# Patient Record
Sex: MALE | Race: WHITE | NOT HISPANIC OR LATINO
[De-identification: names, ages, dates, MRNs, and addresses within clinical notes are randomized per-mention and may not be internally consistent; named-entity substitution may affect disease eponyms.]

---

## 2017-01-20 LAB — INR PPP: 1.18

## 2017-01-31 LAB — INR PPP: 1.17

## 2017-02-08 LAB — INR PPP: 1.17

## 2017-02-17 LAB — INR PPP: 1.19

## 2017-03-06 ENCOUNTER — APPOINTMENT (OUTPATIENT)
Dept: CARDIOLOGY | Facility: CLINIC | Age: 82
End: 2017-03-06

## 2017-03-06 LAB — INR PPP: 1.41

## 2017-03-14 LAB — INR PPP: 6.06

## 2017-03-23 LAB — INR PPP: 4.92

## 2017-04-27 ENCOUNTER — INPATIENT (INPATIENT)
Facility: HOSPITAL | Age: 82
LOS: 3 days | Discharge: NURSING, REHAB OR LONG-TERM CARE | DRG: 291 | End: 2017-05-01
Attending: INTERNAL MEDICINE | Admitting: INTERNAL MEDICINE
Payer: MEDICARE

## 2017-04-27 VITALS
OXYGEN SATURATION: 95 % | WEIGHT: 164.91 LBS | TEMPERATURE: 98 F | RESPIRATION RATE: 17 BRPM | DIASTOLIC BLOOD PRESSURE: 89 MMHG | HEIGHT: 65 IN | SYSTOLIC BLOOD PRESSURE: 165 MMHG | HEART RATE: 80 BPM

## 2017-04-27 DIAGNOSIS — R07.9 CHEST PAIN, UNSPECIFIED: ICD-10-CM

## 2017-04-27 DIAGNOSIS — Z95.0 PRESENCE OF CARDIAC PACEMAKER: Chronic | ICD-10-CM

## 2017-04-27 LAB
ALBUMIN SERPL ELPH-MCNC: 3.4 G/DL — SIGNIFICANT CHANGE UP (ref 3.3–5)
ALP SERPL-CCNC: 138 U/L — HIGH (ref 40–120)
ALT FLD-CCNC: 17 U/L — SIGNIFICANT CHANGE UP (ref 12–78)
ANION GAP SERPL CALC-SCNC: 10 MMOL/L — SIGNIFICANT CHANGE UP (ref 5–17)
APTT BLD: 46 SEC — HIGH (ref 27.5–37.4)
AST SERPL-CCNC: 27 U/L — SIGNIFICANT CHANGE UP (ref 15–37)
BASOPHILS # BLD AUTO: 0.2 K/UL — SIGNIFICANT CHANGE UP (ref 0–0.2)
BASOPHILS NFR BLD AUTO: 2 % — SIGNIFICANT CHANGE UP (ref 0–2)
BILIRUB SERPL-MCNC: 1.2 MG/DL — SIGNIFICANT CHANGE UP (ref 0.2–1.2)
BUN SERPL-MCNC: 53 MG/DL — HIGH (ref 7–23)
CALCIUM SERPL-MCNC: 9.8 MG/DL — SIGNIFICANT CHANGE UP (ref 8.5–10.1)
CHLORIDE SERPL-SCNC: 114 MMOL/L — HIGH (ref 96–108)
CK MB BLD-MCNC: 5.6 % — HIGH (ref 0–3.5)
CK MB CFR SERPL CALC: 3.8 NG/ML — HIGH (ref 0–3.6)
CK SERPL-CCNC: 68 U/L — SIGNIFICANT CHANGE UP (ref 26–308)
CO2 SERPL-SCNC: 22 MMOL/L — SIGNIFICANT CHANGE UP (ref 22–31)
CREAT SERPL-MCNC: 1.9 MG/DL — HIGH (ref 0.5–1.3)
EOSINOPHIL # BLD AUTO: 0.1 K/UL — SIGNIFICANT CHANGE UP (ref 0–0.5)
EOSINOPHIL NFR BLD AUTO: 1.9 % — SIGNIFICANT CHANGE UP (ref 0–6)
GLUCOSE SERPL-MCNC: 108 MG/DL — HIGH (ref 70–99)
HCT VFR BLD CALC: 40.1 % — SIGNIFICANT CHANGE UP (ref 39–50)
HGB BLD-MCNC: 12.8 G/DL — LOW (ref 13–17)
INR BLD: 3.2 RATIO — HIGH (ref 0.88–1.16)
LYMPHOCYTES # BLD AUTO: 0.7 K/UL — LOW (ref 1–3.3)
LYMPHOCYTES # BLD AUTO: 9.2 % — LOW (ref 13–44)
MCHC RBC-ENTMCNC: 30.2 PG — SIGNIFICANT CHANGE UP (ref 27–34)
MCHC RBC-ENTMCNC: 31.8 GM/DL — LOW (ref 32–36)
MCV RBC AUTO: 95.2 FL — SIGNIFICANT CHANGE UP (ref 80–100)
MONOCYTES # BLD AUTO: 0.6 K/UL — SIGNIFICANT CHANGE UP (ref 0–0.9)
MONOCYTES NFR BLD AUTO: 7.4 % — SIGNIFICANT CHANGE UP (ref 1–9)
NEUTROPHILS # BLD AUTO: 6.1 K/UL — SIGNIFICANT CHANGE UP (ref 1.8–7.4)
NEUTROPHILS NFR BLD AUTO: 79.5 % — HIGH (ref 43–77)
NT-PROBNP SERPL-SCNC: HIGH PG/ML (ref 0–450)
PLATELET # BLD AUTO: 141 K/UL — LOW (ref 150–400)
POTASSIUM SERPL-MCNC: 5.1 MMOL/L — SIGNIFICANT CHANGE UP (ref 3.5–5.3)
POTASSIUM SERPL-SCNC: 5.1 MMOL/L — SIGNIFICANT CHANGE UP (ref 3.5–5.3)
PROT SERPL-MCNC: 7.6 G/DL — SIGNIFICANT CHANGE UP (ref 6–8.3)
PROTHROM AB SERPL-ACNC: 35.7 SEC — HIGH (ref 9.8–12.7)
RBC # BLD: 4.22 M/UL — SIGNIFICANT CHANGE UP (ref 4.2–5.8)
RBC # FLD: 15.1 % — HIGH (ref 10.3–14.5)
SODIUM SERPL-SCNC: 146 MMOL/L — HIGH (ref 135–145)
TROPONIN I SERPL-MCNC: 0.03 NG/ML — SIGNIFICANT CHANGE UP (ref 0.01–0.04)
TROPONIN I SERPL-MCNC: 0.04 NG/ML — SIGNIFICANT CHANGE UP (ref 0.01–0.04)
WBC # BLD: 7.7 K/UL — SIGNIFICANT CHANGE UP (ref 3.8–10.5)
WBC # FLD AUTO: 7.7 K/UL — SIGNIFICANT CHANGE UP (ref 3.8–10.5)

## 2017-04-27 PROCEDURE — 93010 ELECTROCARDIOGRAM REPORT: CPT

## 2017-04-27 PROCEDURE — 71020: CPT | Mod: 26

## 2017-04-27 PROCEDURE — 99285 EMERGENCY DEPT VISIT HI MDM: CPT

## 2017-04-27 PROCEDURE — 99223 1ST HOSP IP/OBS HIGH 75: CPT

## 2017-04-27 RX ORDER — SIMVASTATIN 20 MG/1
10 TABLET, FILM COATED ORAL AT BEDTIME
Qty: 0 | Refills: 0 | Status: DISCONTINUED | OUTPATIENT
Start: 2017-04-27 | End: 2017-05-01

## 2017-04-27 RX ORDER — HEPARIN SODIUM 5000 [USP'U]/ML
5000 INJECTION INTRAVENOUS; SUBCUTANEOUS EVERY 12 HOURS
Qty: 0 | Refills: 0 | Status: DISCONTINUED | OUTPATIENT
Start: 2017-04-27 | End: 2017-04-28

## 2017-04-27 RX ORDER — DEXTROSE 50 % IN WATER 50 %
25 SYRINGE (ML) INTRAVENOUS ONCE
Qty: 0 | Refills: 0 | Status: DISCONTINUED | OUTPATIENT
Start: 2017-04-27 | End: 2017-05-01

## 2017-04-27 RX ORDER — FUROSEMIDE 40 MG
80 TABLET ORAL
Qty: 0 | Refills: 0 | Status: DISCONTINUED | OUTPATIENT
Start: 2017-04-27 | End: 2017-04-28

## 2017-04-27 RX ORDER — GABAPENTIN 400 MG/1
100 CAPSULE ORAL
Qty: 0 | Refills: 0 | Status: DISCONTINUED | OUTPATIENT
Start: 2017-04-27 | End: 2017-05-01

## 2017-04-27 RX ORDER — SODIUM CHLORIDE 9 MG/ML
1000 INJECTION, SOLUTION INTRAVENOUS
Qty: 0 | Refills: 0 | Status: DISCONTINUED | OUTPATIENT
Start: 2017-04-27 | End: 2017-05-01

## 2017-04-27 RX ORDER — GLUCAGON INJECTION, SOLUTION 0.5 MG/.1ML
1 INJECTION, SOLUTION SUBCUTANEOUS ONCE
Qty: 0 | Refills: 0 | Status: DISCONTINUED | OUTPATIENT
Start: 2017-04-27 | End: 2017-05-01

## 2017-04-27 RX ORDER — HEPARIN SODIUM 5000 [USP'U]/ML
5000 INJECTION INTRAVENOUS; SUBCUTANEOUS EVERY 12 HOURS
Qty: 0 | Refills: 0 | Status: DISCONTINUED | OUTPATIENT
Start: 2017-04-27 | End: 2017-04-27

## 2017-04-27 RX ORDER — SODIUM CHLORIDE 9 MG/ML
3 INJECTION INTRAMUSCULAR; INTRAVENOUS; SUBCUTANEOUS ONCE
Qty: 0 | Refills: 0 | Status: COMPLETED | OUTPATIENT
Start: 2017-04-27 | End: 2017-04-27

## 2017-04-27 RX ORDER — GABAPENTIN 400 MG/1
100 CAPSULE ORAL
Qty: 0 | Refills: 0 | Status: DISCONTINUED | OUTPATIENT
Start: 2017-04-27 | End: 2017-04-27

## 2017-04-27 RX ORDER — LOSARTAN POTASSIUM 100 MG/1
25 TABLET, FILM COATED ORAL DAILY
Qty: 0 | Refills: 0 | Status: DISCONTINUED | OUTPATIENT
Start: 2017-04-27 | End: 2017-04-30

## 2017-04-27 RX ORDER — DEXTROSE 50 % IN WATER 50 %
12.5 SYRINGE (ML) INTRAVENOUS ONCE
Qty: 0 | Refills: 0 | Status: DISCONTINUED | OUTPATIENT
Start: 2017-04-27 | End: 2017-05-01

## 2017-04-27 RX ORDER — DEXTROSE 50 % IN WATER 50 %
1 SYRINGE (ML) INTRAVENOUS ONCE
Qty: 0 | Refills: 0 | Status: DISCONTINUED | OUTPATIENT
Start: 2017-04-27 | End: 2017-05-01

## 2017-04-27 RX ORDER — INSULIN LISPRO 100/ML
VIAL (ML) SUBCUTANEOUS
Qty: 0 | Refills: 0 | Status: DISCONTINUED | OUTPATIENT
Start: 2017-04-27 | End: 2017-05-01

## 2017-04-27 RX ORDER — ASPIRIN/CALCIUM CARB/MAGNESIUM 324 MG
325 TABLET ORAL ONCE
Qty: 0 | Refills: 0 | Status: COMPLETED | OUTPATIENT
Start: 2017-04-27 | End: 2017-04-27

## 2017-04-27 RX ADMIN — SIMVASTATIN 10 MILLIGRAM(S): 20 TABLET, FILM COATED ORAL at 22:52

## 2017-04-27 RX ADMIN — Medication: at 22:59

## 2017-04-27 RX ADMIN — Medication 80 MILLIGRAM(S): at 19:15

## 2017-04-27 RX ADMIN — LOSARTAN POTASSIUM 25 MILLIGRAM(S): 100 TABLET, FILM COATED ORAL at 22:53

## 2017-04-27 RX ADMIN — SODIUM CHLORIDE 3 MILLILITER(S): 9 INJECTION INTRAMUSCULAR; INTRAVENOUS; SUBCUTANEOUS at 13:46

## 2017-04-27 RX ADMIN — GABAPENTIN 100 MILLIGRAM(S): 400 CAPSULE ORAL at 22:52

## 2017-04-27 NOTE — H&P ADULT - ASSESSMENT
Eric presents to the emergency department with increasing dyspnea over the last 3 days. His wife was at [facility] and now rehabilitation as he has been by himself. Believes he is taking his medication but has not been eating well and has been eating fair foods. He had increasing shortness of breath at rest today, worse with lying down and came to the emergency department. He reports no chest pain, palpitations, lightheadedness, or syncope. He does have some worsening of his chronic leg edema  admit , diuretics , and ro mi , and cardiac eval , and monitor ,     PAST MEDICAL & SURGICAL HISTORY:  History of prostate cancer  Hypercholesteremia  HTN (hypertension)  H/O heart artery stent  DM (diabetes mellitus)  CAD (coronary artery disease)  Artificial cardiac pacemaker  A-fib  History of permanent cardiac pacemaker placement  History of cholecystectomy  H/O prostatectomy  Hx of CABG

## 2017-04-27 NOTE — ED PROVIDER NOTE - PROGRESS NOTE DETAILS
ABILIO Johnson, will see pt to admit. pt with some baseline mental status issues, per daughter. Pt on 1:1 now for mult attempts to get out of bed.

## 2017-04-27 NOTE — ED PROVIDER NOTE - CARE PLAN
Principal Discharge DX:	Chest pain, unspecified type  Secondary Diagnosis:	Shortness of breath  Secondary Diagnosis:	CHF (congestive heart failure)

## 2017-04-27 NOTE — ED PROVIDER NOTE - OBJECTIVE STATEMENT
89 yo M p/w co chest heaviness since this am, some dyspnea as well. Some LE edema. No numb/ting/focal weak. no abd pain. No vomiting / diarrhea. No neck / back pain. No numb/ting/focal weak. No recent travel. Some LE edema noted as well. No agg/allev factors. No other inj or co.

## 2017-04-27 NOTE — H&P ADULT - HISTORY OF PRESENT ILLNESS
HPI:  Eric presents to the emergency department with increasing dyspnea over the last 3 days. His wife was at [facility] and now rehabilitation as he has been by himself. Believes he is taking his medication but has not been eating well and has been eating fair foods. He had increasing shortness of breath at rest today, worse with lying down and came to the emergency department. He reports no chest pain, palpitations, lightheadedness, or syncope. He does have some worsening of his chronic leg edema      PAST MEDICAL & SURGICAL HISTORY:  History of prostate cancer  Hypercholesteremia  HTN (hypertension)  H/O heart artery stent  DM (diabetes mellitus)  CAD (coronary artery disease)  Artificial cardiac pacemaker  A-fib  History of permanent cardiac pacemaker placement  History of cholecystectomy  H/O prostatectomy  Hx of CABG

## 2017-04-27 NOTE — H&P ADULT - NSHPSOCIALHISTORY_GEN_ALL_CORE
, plus kids , lives with wife , who is in rehab now , and non smoker , , walks with a marcos, depressed , dtr in [place] and has some help at home ,

## 2017-04-27 NOTE — ED PROVIDER NOTE - RESPIRATORY, MLM
Breath sounds clear and equal  bilaterally except dec bs at bases. nl resp effort. No acc muscle use

## 2017-04-27 NOTE — ED ADULT NURSE NOTE - PMH
A-fib    Artificial cardiac pacemaker    CAD (coronary artery disease)    DM (diabetes mellitus)    H/O heart artery stent    History of prostate cancer    HTN (hypertension)    Hypercholesteremia

## 2017-04-27 NOTE — CONSULT NOTE ADULT - SUBJECTIVE AND OBJECTIVE BOX
[facility] Consultation    CHIEF COMPLAINT: Patient is a 89y old  Male who presents with a chief complaint of SOB and weakness    HPI:  Eric presents to the emergency department with increasing dyspnea over the last 3 days. His wife was at [facility] and now rehabilitation as he has been by himself. Believes he is taking his medication but has not been eating well and has been eating fair foods. He had increasing shortness of breath at rest today, worse with lying down and came to the emergency department. He reports no chest pain, palpitations, lightheadedness, or syncope. He does have some worsening of his chronic leg edema      PAST MEDICAL & SURGICAL HISTORY:  History of prostate cancer  Hypercholesteremia  HTN (hypertension)  H/O heart artery stent  DM (diabetes mellitus)  CAD (coronary artery disease)  Artificial cardiac pacemaker  A-fib  History of permanent cardiac pacemaker placement  History of cholecystectomy  H/O prostatectomy  Hx of CABG      SOCIAL HISTORY: no tob/etoh    FAMILY HISTORY: no early CAD      MEDICATIONS  (STANDING): warfarin, simvastatin, lisinopril, Clement Bright, gabapentin, furosemide, Cozaar, isosorbide, Cymbalta    Allergies    penicillin (Unknown)          REVIEW OF SYSTEMS:    CONSTITUTIONAL: pos weakness, no fevers or chills  EYES: No visual changes, No diplopia  ENMT: No throat pain , No exudate  NECK: No pain or stiffness  RESPIRATORY: SOB as described  CARDIOVASCULAR: No chest pain or palpitations  GASTROINTESTINAL: No abdominal pain. No nausea, vomiting, or hematemesis; No diarrhea or constipation. No melena or hematochezia.  GENITOURINARY: No dysuria, frequency or hematuria  NEUROLOGICAL: No numbness or weakness  SKIN: No itching or rash  All other review of systems is negative unless indicated above    VITAL SIGNS:   Vital Signs Last 24 Hrs  T(C): 36.6, Max: 36.7 (04-27 @ 13:12)  T(F): 97.9, Max: 98 (04-27 @ 13:12)  HR: 62 (62 - 80)  BP: 160/67 (160/67 - 165/89)  BP(mean): --  RR: 16 (16 - 17)  SpO2: 96% (95% - 96%)    I&O's Summary      PHYSICAL EXAM:    Constitutional: NAD, awake and alert, frail  Eyes:  EOMI,  Pupils round, no lesions  ENMT: no exudate or erythema  Pulmonary: Non-labored, breath sounds are clear bilaterally, decreased BS bilat  Cardiovascular: PMI not palpable Regular S1 and S2, no murmurs, rubs, gallops or clicks                             1+ edema bilat  Gastrointestinal: Bowel Sounds present, soft, nontender.   Lymph: . No cervical lymphadenopathy.  Neurological: Alert, no focal deficits  Skin: No rashes. Changes of chronic venous stasis. No cyanosis.  Psych:  Mood & affect appropriate    LABS: All Labs Reviewed:                        12.8   7.7   )-----------( 141      ( 27 Apr 2017 13:39 )             40.1     27 Apr 2017 13:39    146    |  114    |  53     ----------------------------<  108    5.1     |  22     |  1.90     Ca    9.8        27 Apr 2017 13:39    TPro  7.6    /  Alb  3.4    /  TBili  1.2    /  DBili  x      /  AST  27     /  ALT  17     /  AlkPhos  138    27 Apr 2017 13:39    PT/INR - ( 27 Apr 2017 13:39 )   PT: 35.7 sec;   INR: 3.20 ratio         PTT - ( 27 Apr 2017 13:39 )  PTT:46.0 sec  CARDIAC MARKERS ( 27 Apr 2017 13:39 )  .027 ng/mL / x     / 68 U/L / x     / 3.8 ng/mL        04-27 @ 13:39  Pro Bnp [id number]        RADIOLOGY/EKG:    EXAM:  CHEST PA & LAT                            PROCEDURE DATE:  04/27/2017        INTERPRETATION:  Chest pain, short of breath.    PA lateral. Prior 4/25/2016.  Improvement in the right basal consolidation and effusion. Small   consolidation/effusion and pleural effusion persists. Slight decrease in   the overall congestion. Otherwise no change. No new abnormality.    Impression: As above              ELLE GAVIRIA M.D., ATTENDING RADIOLOGIST  This document has been electronically signed. Apr 27 2017  2:48PM

## 2017-04-27 NOTE — CONSULT NOTE ADULT - ASSESSMENT
Eric appears to have decompensated heart failure on the basis of his known ischemic cardiomyopathy and dietary noncompliance. No evidence for an acute coronary syndrome, new dyspnea arrhythmias, or other active cardiac process. He remains in atrial fibrillation with a ventricular paced rhythm    Plan    Admit to telemetry  Serial cardiac enzymes  Diuresis  Echocardiography  Deep venous thrombosis prophylaxis  Continue aspirin  continue warfarin to an INR of 2.0-3.0    Further management can be dependent on his clinical course

## 2017-04-27 NOTE — ED ADULT NURSE NOTE - OBJECTIVE STATEMENT
Pt arrived c/o chest pain and SOB at home today. Pt denies chest pain at this time. Pt speaking with ease. EKG done, pt on cardiac monitor, VSS at this time. Pt on 2lnc, tolerating well, O2 95%. IV started by EMS, patent. Labs sent per order. Pt resting on stretcher no acute distress, awaiting further orders.

## 2017-04-27 NOTE — ED ADULT NURSE NOTE - PSH
H/O prostatectomy    History of cholecystectomy    History of permanent cardiac pacemaker placement    Hx of CABG

## 2017-04-28 ENCOUNTER — TRANSCRIPTION ENCOUNTER (OUTPATIENT)
Age: 82
End: 2017-04-28

## 2017-04-28 LAB
ANION GAP SERPL CALC-SCNC: 10 MMOL/L — SIGNIFICANT CHANGE UP (ref 5–17)
BASOPHILS # BLD AUTO: 0.2 K/UL — SIGNIFICANT CHANGE UP (ref 0–0.2)
BASOPHILS NFR BLD AUTO: 2.4 % — HIGH (ref 0–2)
BUN SERPL-MCNC: 50 MG/DL — HIGH (ref 7–23)
CALCIUM SERPL-MCNC: 9.8 MG/DL — SIGNIFICANT CHANGE UP (ref 8.5–10.1)
CHLORIDE SERPL-SCNC: 111 MMOL/L — HIGH (ref 96–108)
CK SERPL-CCNC: 54 U/L — SIGNIFICANT CHANGE UP (ref 26–308)
CO2 SERPL-SCNC: 25 MMOL/L — SIGNIFICANT CHANGE UP (ref 22–31)
CREAT SERPL-MCNC: 1.9 MG/DL — HIGH (ref 0.5–1.3)
EOSINOPHIL # BLD AUTO: 1 K/UL — HIGH (ref 0–0.5)
EOSINOPHIL NFR BLD AUTO: 13 % — HIGH (ref 0–6)
GLUCOSE SERPL-MCNC: 87 MG/DL — SIGNIFICANT CHANGE UP (ref 70–99)
HBA1C BLD-MCNC: 5.3 % — SIGNIFICANT CHANGE UP (ref 4–5.6)
HCT VFR BLD CALC: 40.5 % — SIGNIFICANT CHANGE UP (ref 39–50)
HGB BLD-MCNC: 12.8 G/DL — LOW (ref 13–17)
INR BLD: 2.67 RATIO — HIGH (ref 0.88–1.16)
LYMPHOCYTES # BLD AUTO: 1.8 K/UL — SIGNIFICANT CHANGE UP (ref 1–3.3)
LYMPHOCYTES # BLD AUTO: 23.7 % — SIGNIFICANT CHANGE UP (ref 13–44)
MCHC RBC-ENTMCNC: 30.1 PG — SIGNIFICANT CHANGE UP (ref 27–34)
MCHC RBC-ENTMCNC: 31.6 GM/DL — LOW (ref 32–36)
MCV RBC AUTO: 95.4 FL — SIGNIFICANT CHANGE UP (ref 80–100)
MONOCYTES # BLD AUTO: 0.7 K/UL — SIGNIFICANT CHANGE UP (ref 0–0.9)
MONOCYTES NFR BLD AUTO: 9.1 % — HIGH (ref 1–9)
NEUTROPHILS # BLD AUTO: 3.9 K/UL — SIGNIFICANT CHANGE UP (ref 1.8–7.4)
NEUTROPHILS NFR BLD AUTO: 51.9 % — SIGNIFICANT CHANGE UP (ref 43–77)
PLATELET # BLD AUTO: 143 K/UL — LOW (ref 150–400)
POTASSIUM SERPL-MCNC: 4.3 MMOL/L — SIGNIFICANT CHANGE UP (ref 3.5–5.3)
POTASSIUM SERPL-SCNC: 4.3 MMOL/L — SIGNIFICANT CHANGE UP (ref 3.5–5.3)
PROTHROM AB SERPL-ACNC: 29.7 SEC — HIGH (ref 9.8–12.7)
RBC # BLD: 4.24 M/UL — SIGNIFICANT CHANGE UP (ref 4.2–5.8)
RBC # FLD: 15.5 % — HIGH (ref 10.3–14.5)
SODIUM SERPL-SCNC: 146 MMOL/L — HIGH (ref 135–145)
TROPONIN I SERPL-MCNC: 0.03 NG/ML — SIGNIFICANT CHANGE UP (ref 0.01–0.04)
TROPONIN I SERPL-MCNC: 0.04 NG/ML — SIGNIFICANT CHANGE UP (ref 0.01–0.04)
WBC # BLD: 7.5 K/UL — SIGNIFICANT CHANGE UP (ref 3.8–10.5)
WBC # FLD AUTO: 7.5 K/UL — SIGNIFICANT CHANGE UP (ref 3.8–10.5)

## 2017-04-28 PROCEDURE — 99233 SBSQ HOSP IP/OBS HIGH 50: CPT | Mod: 25

## 2017-04-28 PROCEDURE — 93284 PRGRMG EVAL IMPLANTABLE DFB: CPT | Mod: 26

## 2017-04-28 RX ORDER — GABAPENTIN 400 MG/1
1 CAPSULE ORAL
Qty: 0 | Refills: 0 | COMMUNITY
Start: 2017-04-28

## 2017-04-28 RX ORDER — MELOXICAM 15 MG/1
0 TABLET ORAL
Qty: 0 | Refills: 0 | COMMUNITY

## 2017-04-28 RX ORDER — SIMVASTATIN 20 MG/1
0 TABLET, FILM COATED ORAL
Qty: 0 | Refills: 0 | COMMUNITY

## 2017-04-28 RX ORDER — ISOSORBIDE DINITRATE 5 MG/1
0 TABLET ORAL
Qty: 0 | Refills: 0 | COMMUNITY

## 2017-04-28 RX ORDER — FUROSEMIDE 40 MG
40 TABLET ORAL
Qty: 0 | Refills: 0 | Status: DISCONTINUED | OUTPATIENT
Start: 2017-04-28 | End: 2017-05-01

## 2017-04-28 RX ORDER — WARFARIN SODIUM 2.5 MG/1
1 TABLET ORAL
Qty: 0 | Refills: 0 | COMMUNITY

## 2017-04-28 RX ORDER — FUROSEMIDE 40 MG
1 TABLET ORAL
Qty: 0 | Refills: 0 | COMMUNITY
Start: 2017-04-28

## 2017-04-28 RX ORDER — GABAPENTIN 400 MG/1
0 CAPSULE ORAL
Qty: 0 | Refills: 0 | COMMUNITY

## 2017-04-28 RX ORDER — GLIMEPIRIDE 1 MG
0 TABLET ORAL
Qty: 0 | Refills: 0 | COMMUNITY

## 2017-04-28 RX ORDER — LOSARTAN POTASSIUM 100 MG/1
0 TABLET, FILM COATED ORAL
Qty: 0 | Refills: 0 | COMMUNITY

## 2017-04-28 RX ORDER — ASPIRIN/CALCIUM CARB/MAGNESIUM 324 MG
81 TABLET ORAL DAILY
Qty: 0 | Refills: 0 | Status: DISCONTINUED | OUTPATIENT
Start: 2017-04-28 | End: 2017-05-01

## 2017-04-28 RX ORDER — GLIMEPIRIDE 1 MG
1 TABLET ORAL
Qty: 0 | Refills: 0 | COMMUNITY

## 2017-04-28 RX ORDER — LISINOPRIL 2.5 MG/1
0 TABLET ORAL
Qty: 0 | Refills: 0 | COMMUNITY

## 2017-04-28 RX ORDER — FUROSEMIDE 40 MG
0 TABLET ORAL
Qty: 0 | Refills: 0 | COMMUNITY

## 2017-04-28 RX ORDER — ZOLPIDEM TARTRATE 10 MG/1
5 TABLET ORAL AT BEDTIME
Qty: 0 | Refills: 0 | Status: DISCONTINUED | OUTPATIENT
Start: 2017-04-28 | End: 2017-05-01

## 2017-04-28 RX ORDER — LOSARTAN POTASSIUM 100 MG/1
1 TABLET, FILM COATED ORAL
Qty: 0 | Refills: 0 | COMMUNITY
Start: 2017-04-28

## 2017-04-28 RX ORDER — DULOXETINE HYDROCHLORIDE 30 MG/1
0 CAPSULE, DELAYED RELEASE ORAL
Qty: 0 | Refills: 0 | COMMUNITY

## 2017-04-28 RX ORDER — INFLUENZA VIRUS VACCINE 15; 15; 15; 15 UG/.5ML; UG/.5ML; UG/.5ML; UG/.5ML
0.5 SUSPENSION INTRAMUSCULAR ONCE
Qty: 0 | Refills: 0 | Status: DISCONTINUED | OUTPATIENT
Start: 2017-04-28 | End: 2017-05-01

## 2017-04-28 RX ORDER — SIMVASTATIN 20 MG/1
1 TABLET, FILM COATED ORAL
Qty: 0 | Refills: 0 | COMMUNITY
Start: 2017-04-28

## 2017-04-28 RX ORDER — WARFARIN SODIUM 2.5 MG/1
2 TABLET ORAL ONCE
Qty: 0 | Refills: 0 | Status: COMPLETED | OUTPATIENT
Start: 2017-04-28 | End: 2017-04-28

## 2017-04-28 RX ADMIN — LOSARTAN POTASSIUM 25 MILLIGRAM(S): 100 TABLET, FILM COATED ORAL at 05:45

## 2017-04-28 RX ADMIN — WARFARIN SODIUM 2 MILLIGRAM(S): 2.5 TABLET ORAL at 21:15

## 2017-04-28 RX ADMIN — Medication 80 MILLIGRAM(S): at 05:44

## 2017-04-28 RX ADMIN — HEPARIN SODIUM 5000 UNIT(S): 5000 INJECTION INTRAVENOUS; SUBCUTANEOUS at 05:46

## 2017-04-28 RX ADMIN — SIMVASTATIN 10 MILLIGRAM(S): 20 TABLET, FILM COATED ORAL at 21:15

## 2017-04-28 RX ADMIN — Medication 81 MILLIGRAM(S): at 11:33

## 2017-04-28 RX ADMIN — GABAPENTIN 100 MILLIGRAM(S): 400 CAPSULE ORAL at 05:45

## 2017-04-28 RX ADMIN — GABAPENTIN 100 MILLIGRAM(S): 400 CAPSULE ORAL at 18:13

## 2017-04-28 RX ADMIN — Medication 40 MILLIGRAM(S): at 18:13

## 2017-04-28 NOTE — PROGRESS NOTE ADULT - SUBJECTIVE AND OBJECTIVE BOX
PCP  Subjective:   in bed awake , sleepy , had BF , and responsive , and oriented ,     Objective:   T(F): 97.4, Max: 98 ( @ 13:12)  HR: 62 (60 - 80)  BP: 135/66 (135/66 - 195/79)  RR: 18 (16 - 18)  SpO2: 100% (95% - 100%)  Wt(kg): --  Daily Height in cm: 165.1 (2017 13:12)    Daily Weight in k.9 (2017 05:43)    GENERAL:  wdwn, frail  EYES: eomi  NECK: supple  CHEST/LUNG: rales bases  HEART: s1 s2 irregularly irregular 2/6 betsy  ABDOMEN: soft nt  EXTREMITIES:  no cce  SKIN:warm  CNS: awake , responsive , non focal weak , poor balance     Allergies: Allergies    penicillin (Unknown)    Intolerances        Medications:   heparin  Injectable 5000Unit(s) SubCutaneous every 12 hours  losartan 25milliGRAM(s) Oral daily  simvastatin 10milliGRAM(s) Oral at bedtime  insulin lispro (HumaLOG) corrective regimen sliding scale  SubCutaneous three times a day before meals  dextrose 5%. 1000milliLiter(s) IV Continuous <Continuous>  dextrose Gel 1Dose(s) Oral once PRN  dextrose 50% Injectable 12.5Gram(s) IV Push once  dextrose 50% Injectable 25Gram(s) IV Push once  dextrose 50% Injectable 25Gram(s) IV Push once  glucagon  Injectable 1milliGRAM(s) IntraMuscular once PRN  gabapentin 100milliGRAM(s) Oral two times a day  influenza   Vaccine 0.5milliLiter(s) IntraMuscular once  furosemide    Tablet 40milliGRAM(s) Oral two times a day  aspirin enteric coated 81milliGRAM(s) Oral daily      LABS:                        12.8   7.5   )-----------( 143      ( 2017 06:51 )             40.5     -    146<H>  |  111<H>  |  50<H>  ----------------------------<  87  4.3   |  25  |  1.90<H>    Ca    9.8      2017 06:51    TPro  7.6  /  Alb  3.4  /  TBili  1.2  /  DBili  x   /  AST  27  /  ALT  17  /  AlkPhos  138<H>      PT/INR - ( 2017 06:51 )   PT: 29.7 sec;   INR: 2.67 ratio         PTT - ( 2017 13:39 )  PTT:46.0 sec   @ 06:51  INR 2.67   @ 13:39  INR 3.20        CAPILLARY BLOOD GLUCOSE  84 (2017 07:55)  100 (2017 22:07)        CARDIAC MARKERS ( 2017 06:51 )  .036 ng/mL / x     / 54 U/L / x     / x      CARDIAC MARKERS ( 2017 22:37 )  .036 ng/mL / x     / x     / x     / x      CARDIAC MARKERS ( 2017 13:39 )  .027 ng/mL / x     / 68 U/L / x     / 3.8 ng/mL      RECENT CULTURES:

## 2017-04-28 NOTE — DISCHARGE NOTE ADULT - SECONDARY DIAGNOSIS.
Coronary artery disease involving coronary bypass graft, angina presence unspecified, unspecified whether native or transplanted heart Chronic atrial fibrillation DM (diabetes mellitus) type II uncontrolled, periph vascular disorder Chest pain, unspecified type History of cholecystectomy History of prostate cancer

## 2017-04-28 NOTE — DISCHARGE NOTE ADULT - HOSPITAL COURSE
Assessment and Plan:   · Assessment		    Eric presents to the emergency department with increasing dyspnea over the last 3 days. His wife was at [facility] and now rehabilitation as he has been by himself. Believes he is taking his medication but has not been eating well and has been eating fair foods. He had increasing shortness of breath at rest today, worse with lying down and came to the emergency department. He reports no chest pain, palpitations, lightheadedness, or syncope. He does have some worsening of his chronic leg edema  admit , diuretics , and ro mi , and cardiac eval , and monitor ,     PAST MEDICAL & SURGICAL HISTORY:  History of prostate cancer  Hypercholesteremia  HTN (hypertension)  H/O heart artery stent  DM (diabetes mellitus)  CAD (coronary artery disease)  Artificial cardiac pacemaker  A-fib  CKD stage 3  History of permanent cardiac pacemaker placement  History of cholecystectomy  H/O prostatectomy  Hx of CABG    patient seems better , CE negative , dec edema in legs , plan Physical therapy and rehab , resume diuretics   dc to rehab for improvement ,

## 2017-04-28 NOTE — DISCHARGE NOTE ADULT - MEDICATION SUMMARY - MEDICATIONS TO STOP TAKING
I will STOP taking the medications listed below when I get home from the hospital:    predniSONE  --  by mouth    lisinopril  --  by mouth    Mobic  --  by mouth    isosorbide  --    Cymbalta  --  by mouth

## 2017-04-28 NOTE — DISCHARGE NOTE ADULT - OTHER SIGNIFICANT FINDINGS
over all stable  , alert and responsive , and some rales in bases , and has poor balance over all stable  , alert and responsive , and some rales in bases , and has poor balance       LVEF: 55%  RVSP: 67mmHg    FINDINGS  Left Ventricle: Endocardium is not well-visualized. Grossly, there   appears to be preserved left ventricular systolic function.  Aortic Valve: Calcified trileaflet aortic valve. Mild aortic   insufficiency.  Mitral Valve: Mitral annular calcination and calcified mitral leaflets.   Mild to moderate mitral insufficiency.  Tricuspid Valve: Normal tricuspid valve. At least moderate tricuspid   insufficiency.  Pulmonic Valve: Normal pulmonic valve. Moderate pulmonic insufficiency.  Left Atrium: Severely enlarged  Right Ventricle: Not well visualized. Grossly normal. A device wire is   noted in the right heart.  Right Atrium: Not well visualized. Grossly mildly enlarged.  Pericardium/Pleura: Normal pericardium with no pericardial effusion.   Large bilateral pleural effusions.

## 2017-04-28 NOTE — DISCHARGE NOTE ADULT - CARE PLAN
Principal Discharge DX:	Acute on chronic congestive heart failure, unspecified congestive heart failure type  Goal:	lasix , follow up labs  Instructions for follow-up, activity and diet:	try to be low salt  Secondary Diagnosis:	Coronary artery disease involving coronary bypass graft, angina presence unspecified, unspecified whether native or transplanted heart  Goal:	coumadin  Secondary Diagnosis:	Chronic atrial fibrillation  Goal:	coumadin  Secondary Diagnosis:	DM (diabetes mellitus) type II uncontrolled, periph vascular disorder  Goal:	glimperide  Secondary Diagnosis:	Chest pain, unspecified type  Goal:	monitor  Secondary Diagnosis:	History of cholecystectomy  Secondary Diagnosis:	History of prostate cancer

## 2017-04-28 NOTE — PHYSICAL THERAPY INITIAL EVALUATION ADULT - ADDITIONAL COMMENTS
Pt lives with his spouse in a house and has chair lift. Pt ambulates with rolling walker mostly independently and is independent with ADLs. Spouse currently at rehab at [facility].

## 2017-04-28 NOTE — DISCHARGE NOTE ADULT - CARE PROVIDER_API CALL
Steven Johnson), Internal Medicine  [address] [place] [address]  Phone: [phone]  Fax: [phone]

## 2017-04-28 NOTE — DISCHARGE NOTE ADULT - PATIENT PORTAL LINK FT
“You can access the Follow[employer] Patient Portal, offered by [facility], by registering with the following website: [email or web address]”

## 2017-04-28 NOTE — DISCHARGE NOTE ADULT - MEDICATION SUMMARY - MEDICATIONS TO TAKE
I will START or STAY ON the medications listed below when I get home from the hospital:    losartan 25 mg oral tablet  -- 1 tab(s) by mouth once a day  -- Indication: For CHF (congestive heart failure)    Coumadin 2 mg oral tablet  -- 1 tab(s) by mouth once a day  -- Indication: For a fib    gabapentin 100 mg oral capsule  -- 1 cap(s) by mouth 2 times a day  -- Indication: For neuropathy    glimepiride  -- 1 milligram(s) by mouth once a day  -- Indication: For dm     simvastatin 10 mg oral tablet  -- 1 tab(s) by mouth once a day (at bedtime)  -- Indication: For lipids    furosemide 40 mg oral tablet  -- 1 tab(s) by mouth 2 times a day  -- Indication: For CHF (congestive heart failure)

## 2017-04-28 NOTE — PROGRESS NOTE ADULT - SUBJECTIVE AND OBJECTIVE BOX
[facility] - Miriam Carrasco, Nia, Sophie, Damon Salazar    Patient resting comfortably in bed in NAD.  Laying flat with no respiratory distress.  No complaints of chest pain, dyspnea, palpitations, PND, or orthopnea.    Telemetry:  Rate controlled atrial fibrillation    MEDICATIONS  (STANDING):  heparin  Injectable 5000Unit(s) SubCutaneous every 12 hours  losartan 25milliGRAM(s) Oral daily  simvastatin 10milliGRAM(s) Oral at bedtime  insulin lispro (HumaLOG) corrective regimen sliding scale  SubCutaneous three times a day before meals  dextrose 5%. 1000milliLiter(s) IV Continuous <Continuous>  dextrose 50% Injectable 12.5Gram(s) IV Push once  dextrose 50% Injectable 25Gram(s) IV Push once  dextrose 50% Injectable 25Gram(s) IV Push once  gabapentin 100milliGRAM(s) Oral two times a day  influenza   Vaccine 0.5milliLiter(s) IntraMuscular once  furosemide    Tablet 40milliGRAM(s) Oral two times a day  aspirin enteric coated 81milliGRAM(s) Oral daily    MEDICATIONS  (PRN):  dextrose Gel 1Dose(s) Oral once PRN Blood Glucose LESS THAN 70 milliGRAM(s)/deciliter  glucagon  Injectable 1milliGRAM(s) IntraMuscular once PRN Glucose LESS THAN 70 milligrams/deciliter      Allergies    penicillin (Unknown)        Vital Signs Last 24 Hrs  T(C): 36.3, Max: 36.7 (04-27 @ 13:12)  T(F): 97.4, Max: 98 (04-27 @ 13:12)  HR: 62 (60 - 80)  BP: 135/66 (135/66 - 195/79)  BP(mean): --  RR: 18 (16 - 18)  SpO2: 100% (95% - 100%)    I&O's Summary    I & Os for current day (as of 28 Apr 2017 09:32)  =============================================  IN: 0 ml / OUT: 550 ml / NET: -550 ml      ON EXAM:    General: NAD, lethargic, but arousable  HEENT: Mucous membranes are dry, anicteric  Lungs: Non-labored, breath sounds are clear bilaterally, No wheezing, rales or rhonchi  Cardiovascular: Irregular, S1 and S2.    Gastrointestinal: Bowel Sounds present, soft, nontender.   Lymph: No peripheral edema. No lymphadenopathy.  Skin: No rashes or ulcers  Psych:  Mood & affect appropriate    LABS: All Labs Reviewed:                        12.8   7.5   )-----------( 143      ( 28 Apr 2017 06:51 )             40.5                         12.8   7.7   )-----------( 141      ( 27 Apr 2017 13:39 )             40.1     28 Apr 2017 06:51    146    |  111    |  50     ----------------------------<  87     4.3     |  25     |  1.90   27 Apr 2017 13:39    146    |  114    |  53     ----------------------------<  108    5.1     |  22     |  1.90     Ca    9.8        28 Apr 2017 06:51  Ca    9.8        27 Apr 2017 13:39    TPro  7.6    /  Alb  3.4    /  TBili  1.2    /  DBili  x      /  AST  27     /  ALT  17     /  AlkPhos  138    27 Apr 2017 13:39    PT/INR - ( 28 Apr 2017 06:51 )   PT: 29.7 sec;   INR: 2.67 ratio         PTT - ( 27 Apr 2017 13:39 )  PTT:46.0 sec  CARDIAC MARKERS ( 28 Apr 2017 06:51 )  .036 ng/mL / x     / 54 U/L / x     / x      CARDIAC MARKERS ( 27 Apr 2017 22:37 )  .036 ng/mL / x     / x     / x     / x      CARDIAC MARKERS ( 27 Apr 2017 13:39 )  .027 ng/mL / x     / 68 U/L / x     / 3.8 ng/mL    04-27 @ 13:39  Pro Bnp [id number]        Assessment/Plan:  89y Male with CAD, CABG, ICM, a/w acute systolic CHF, now improved    - Continue aspirin 81 QD  - Continue Lasix 40 BID  - Continue losartan 25 QD  - Continue statin drug  - Check ICD  - D/C planning  - Monitor and replete potassium to greater than 4.0 and magnesium to greater than 2.0  - Supplemental oxygen as needed

## 2017-04-28 NOTE — DISCHARGE NOTE ADULT - MEDICATION SUMMARY - MEDICATIONS TO CHANGE
I will SWITCH the dose or number of times a day I take the medications listed below when I get home from the hospital:    Coumadin  -- 4.5 milligram(s) by mouth 2 times a week  mondays and fridays    Coumadin 3 mg oral tablet  -- 1 tab(s) by mouth 4 times a week tues wed thurs sat

## 2017-04-28 NOTE — PROGRESS NOTE ADULT - ASSESSMENT
Eric presents to the emergency department with increasing dyspnea over the last 3 days. His wife was at [facility] and now rehabilitation as he has been by himself. Believes he is taking his medication but has not been eating well and has been eating fair foods. He had increasing shortness of breath at rest today, worse with lying down and came to the emergency department. He reports no chest pain, palpitations, lightheadedness, or syncope. He does have some worsening of his chronic leg edema  admit , diuretics , and ro mi , and cardiac eval , and monitor ,     PAST MEDICAL & SURGICAL HISTORY:  History of prostate cancer  Hypercholesteremia  HTN (hypertension)  H/O heart artery stent  DM (diabetes mellitus)  CAD (coronary artery disease)  Artificial cardiac pacemaker  A-fib  CKD stage 3  History of permanent cardiac pacemaker placement  History of cholecystectomy  H/O prostatectomy  Hx of CABG    patient seems better , CE negative , dec edema in legs , plan Physical therapy and rehab , resume diuretics

## 2017-04-29 DIAGNOSIS — I50.9 HEART FAILURE, UNSPECIFIED: ICD-10-CM

## 2017-04-29 LAB
APPEARANCE UR: ABNORMAL
BACTERIA # UR AUTO: ABNORMAL
BILIRUB UR-MCNC: NEGATIVE — SIGNIFICANT CHANGE UP
COLOR SPEC: YELLOW — SIGNIFICANT CHANGE UP
DIFF PNL FLD: NEGATIVE — SIGNIFICANT CHANGE UP
EPI CELLS # UR: SIGNIFICANT CHANGE UP
GLUCOSE UR QL: NEGATIVE — SIGNIFICANT CHANGE UP
KETONES UR-MCNC: NEGATIVE — SIGNIFICANT CHANGE UP
LEUKOCYTE ESTERASE UR-ACNC: NEGATIVE — SIGNIFICANT CHANGE UP
NITRITE UR-MCNC: NEGATIVE — SIGNIFICANT CHANGE UP
PH UR: 7 — SIGNIFICANT CHANGE UP (ref 5–8)
PROT UR-MCNC: 25 MG/DL
RBC CASTS # UR COMP ASSIST: NEGATIVE /HPF — SIGNIFICANT CHANGE UP (ref 0–4)
SP GR SPEC: 1 — LOW (ref 1.01–1.02)
UROBILINOGEN FLD QL: NEGATIVE — SIGNIFICANT CHANGE UP
WBC UR QL: SIGNIFICANT CHANGE UP

## 2017-04-29 PROCEDURE — 93306 TTE W/DOPPLER COMPLETE: CPT | Mod: 26

## 2017-04-29 PROCEDURE — 71010: CPT | Mod: 26

## 2017-04-29 PROCEDURE — 99233 SBSQ HOSP IP/OBS HIGH 50: CPT | Mod: 25

## 2017-04-29 RX ORDER — FUROSEMIDE 40 MG
40 TABLET ORAL ONCE
Qty: 0 | Refills: 0 | Status: COMPLETED | OUTPATIENT
Start: 2017-04-29 | End: 2017-04-29

## 2017-04-29 RX ADMIN — Medication 1: at 11:52

## 2017-04-29 RX ADMIN — Medication 40 MILLIGRAM(S): at 17:15

## 2017-04-29 RX ADMIN — Medication 40 MILLIGRAM(S): at 12:41

## 2017-04-29 RX ADMIN — SIMVASTATIN 10 MILLIGRAM(S): 20 TABLET, FILM COATED ORAL at 21:54

## 2017-04-29 RX ADMIN — GABAPENTIN 100 MILLIGRAM(S): 400 CAPSULE ORAL at 17:15

## 2017-04-29 RX ADMIN — GABAPENTIN 100 MILLIGRAM(S): 400 CAPSULE ORAL at 05:25

## 2017-04-29 RX ADMIN — LOSARTAN POTASSIUM 25 MILLIGRAM(S): 100 TABLET, FILM COATED ORAL at 05:25

## 2017-04-29 RX ADMIN — Medication 81 MILLIGRAM(S): at 11:51

## 2017-04-29 RX ADMIN — Medication 40 MILLIGRAM(S): at 05:25

## 2017-04-29 NOTE — PROGRESS NOTE ADULT - SUBJECTIVE AND OBJECTIVE BOX
[facility] - Miriam Carrasco, Sophie Kramer, Damon Salazar    Patient resting comfortably in bed in NAD.  Laying flat with no respiratory distress.  No complaints of chest pain, dyspnea, palpitations, PND, or orthopnea.    Telemetry:  Normal sinus rhythm    MEDICATIONS  (STANDING):  losartan 25milliGRAM(s) Oral daily  simvastatin 10milliGRAM(s) Oral at bedtime  insulin lispro (HumaLOG) corrective regimen sliding scale  SubCutaneous three times a day before meals  dextrose 5%. 1000milliLiter(s) IV Continuous <Continuous>  dextrose 50% Injectable 12.5Gram(s) IV Push once  dextrose 50% Injectable 25Gram(s) IV Push once  dextrose 50% Injectable 25Gram(s) IV Push once  gabapentin 100milliGRAM(s) Oral two times a day  influenza   Vaccine 0.5milliLiter(s) IntraMuscular once  furosemide    Tablet 40milliGRAM(s) Oral two times a day  aspirin enteric coated 81milliGRAM(s) Oral daily    MEDICATIONS  (PRN):  dextrose Gel 1Dose(s) Oral once PRN Blood Glucose LESS THAN 70 milliGRAM(s)/deciliter  glucagon  Injectable 1milliGRAM(s) IntraMuscular once PRN Glucose LESS THAN 70 milligrams/deciliter  zolpidem 5milliGRAM(s) Oral at bedtime PRN Insomnia      Allergies    penicillin (Unknown)        Vital Signs Last 24 Hrs  T(C): 36.4, Max: 36.5 (04-29 @ 00:15)  T(F): 97.5, Max: 97.7 (04-29 @ 00:15)  HR: 68 (58 - 68)  BP: 150/80 (146/76 - 159/66)  BP(mean): --  RR: 16 (16 - 18)  SpO2: 95% (94% - 100%)    I&O's Summary    I & Os for current day (as of 29 Apr 2017 07:32)  =============================================  IN: 200 ml / OUT: 1475 ml / NET: -1275 ml      ON EXAM:    General: NAD  HEENT: Mucous membranes are dry, anicteric  Lungs: Non-labored, breath sounds are clear bilaterally, No wheezing, rales or rhonchi  Cardiovascular: Regular, S1 and S2, no rubs, or gallops.  2/6 systolic murmur  Gastrointestinal: Bowel Sounds present, soft, nontender.   Lymph: Minimal b/l LE edema. No lymphadenopathy.  Skin: No rashes or ulcers    LABS: All Labs Reviewed:                        12.8   7.5   )-----------( 143      ( 28 Apr 2017 06:51 )             40.5                         12.8   7.7   )-----------( 141      ( 27 Apr 2017 13:39 )             40.1     28 Apr 2017 06:51    146    |  111    |  50     ----------------------------<  87     4.3     |  25     |  1.90   27 Apr 2017 13:39    146    |  114    |  53     ----------------------------<  108    5.1     |  22     |  1.90     Ca    9.8        28 Apr 2017 06:51  Ca    9.8        27 Apr 2017 13:39    TPro  7.6    /  Alb  3.4    /  TBili  1.2    /  DBili  x      /  AST  27     /  ALT  17     /  AlkPhos  138    27 Apr 2017 13:39    PT/INR - ( 28 Apr 2017 06:51 )   PT: 29.7 sec;   INR: 2.67 ratio         PTT - ( 27 Apr 2017 13:39 )  PTT:46.0 sec  CARDIAC MARKERS ( 28 Apr 2017 14:21 )  .028 ng/mL / x     / x     / x     / x      CARDIAC MARKERS ( 28 Apr 2017 06:51 )  .036 ng/mL / x     / 54 U/L / x     / x      CARDIAC MARKERS ( 27 Apr 2017 22:37 )  .036 ng/mL / x     / x     / x     / x      CARDIAC MARKERS ( 27 Apr 2017 13:39 )  .027 ng/mL / x     / 68 U/L / x     / 3.8 ng/mL      Blood Culture:   04-27 @ 13:39  Pro Bnp [id number]      Assessment/Plan:   89y Male with CAD, CABG, ICM, a/w acute systolic CHF, now improved    - Continue aspirin 81 QD  - Continue Lasix 40 BID  - Continue losartan 25 QD  - Continue statin drug  - ICD check with adequate battery for now.  needs to follow up as outpatient.  - D/C planning  - D/C telemetry  - Monitor and replete potassium to greater than 4.0 and magnesium to greater than 2.0  - Supplemental oxygen as needed

## 2017-04-29 NOTE — CHART NOTE - NSCHARTNOTEFT_GEN_A_CORE
PGY-3 On Call Note    Called by RN, pt c/o SOB.    Pt seen and examined at bedside. Pt states that he became SOB at rest while lying in bed. No associated chest pain, nausea/vomiting, palpitations, dizziness, HA, or other complaints.  Pt states that he gets SOB from time to time and that is what brought him in to the hospital for this current admission.     T(F): 98.2, Max: 98.2 (04-29 @ 08:01)  HR: 59 (59 - 68)  BP: 160/66 (150/80 - 160/66)  RR: 16 (16 - 16)  SpO2: 95% (95% - 95%), rechecked and found to be saturating 98% on RA.     PE:  Gen: NAD  HEENT: WNL  Cardio:  Pulm: rales at bases B/L  GI: soft, NT/ND  Ext: 2+ pitting edema, B/L LE.     A/P: SOB likely from fluid overload 2/2 to pt's underlying HF. TTE ordered by primary team to evaluate heart function. cardio following. Pt on 40mg PO Lasix BID (6am and 6pm). Will give one dose of Lasix 40mg IV now. Will order CXR to evaluate for pulmonary edema. RN to call with any changes.

## 2017-04-29 NOTE — PROGRESS NOTE ADULT - SUBJECTIVE AND OBJECTIVE BOX
pt seen on rounds echo done this am vss afebrile lungs essentially clear  heart regular  rate abdomen benign  extremities no  edema neuro alert  mildly confused await social work to complete yoel to discharge to rehab

## 2017-04-30 LAB
CULTURE RESULTS: SIGNIFICANT CHANGE UP
SPECIMEN SOURCE: SIGNIFICANT CHANGE UP

## 2017-04-30 PROCEDURE — 99232 SBSQ HOSP IP/OBS MODERATE 35: CPT

## 2017-04-30 RX ORDER — HYDRALAZINE HCL 50 MG
10 TABLET ORAL ONCE
Qty: 0 | Refills: 0 | Status: COMPLETED | OUTPATIENT
Start: 2017-04-30 | End: 2017-04-30

## 2017-04-30 RX ORDER — LOSARTAN POTASSIUM 100 MG/1
50 TABLET, FILM COATED ORAL DAILY
Qty: 0 | Refills: 0 | Status: DISCONTINUED | OUTPATIENT
Start: 2017-04-30 | End: 2017-05-01

## 2017-04-30 RX ADMIN — GABAPENTIN 100 MILLIGRAM(S): 400 CAPSULE ORAL at 17:24

## 2017-04-30 RX ADMIN — Medication 40 MILLIGRAM(S): at 17:24

## 2017-04-30 RX ADMIN — LOSARTAN POTASSIUM 50 MILLIGRAM(S): 100 TABLET, FILM COATED ORAL at 12:49

## 2017-04-30 RX ADMIN — SIMVASTATIN 10 MILLIGRAM(S): 20 TABLET, FILM COATED ORAL at 22:08

## 2017-04-30 RX ADMIN — Medication 40 MILLIGRAM(S): at 05:52

## 2017-04-30 RX ADMIN — GABAPENTIN 100 MILLIGRAM(S): 400 CAPSULE ORAL at 05:52

## 2017-04-30 RX ADMIN — LOSARTAN POTASSIUM 25 MILLIGRAM(S): 100 TABLET, FILM COATED ORAL at 05:52

## 2017-04-30 RX ADMIN — Medication 10 MILLIGRAM(S): at 00:30

## 2017-04-30 RX ADMIN — Medication 81 MILLIGRAM(S): at 12:49

## 2017-04-30 NOTE — PROGRESS NOTE ADULT - SUBJECTIVE AND OBJECTIVE BOX
[facility] - Miriam Carrasco, Sophie Kramer, Damon Salazar    Patient resting comfortably in bed in NAD.  Laying flat with no respiratory distress.  No complaints of chest pain, dyspnea, palpitations, PND, or orthopnea.    Telemetry:  OFF    MEDICATIONS  (STANDING):  losartan 25milliGRAM(s) Oral daily  simvastatin 10milliGRAM(s) Oral at bedtime  insulin lispro (HumaLOG) corrective regimen sliding scale  SubCutaneous three times a day before meals  dextrose 5%. 1000milliLiter(s) IV Continuous <Continuous>  dextrose 50% Injectable 12.5Gram(s) IV Push once  dextrose 50% Injectable 25Gram(s) IV Push once  dextrose 50% Injectable 25Gram(s) IV Push once  gabapentin 100milliGRAM(s) Oral two times a day  influenza   Vaccine 0.5milliLiter(s) IntraMuscular once  furosemide    Tablet 40milliGRAM(s) Oral two times a day  aspirin enteric coated 81milliGRAM(s) Oral daily    MEDICATIONS  (PRN):  dextrose Gel 1Dose(s) Oral once PRN Blood Glucose LESS THAN 70 milliGRAM(s)/deciliter  glucagon  Injectable 1milliGRAM(s) IntraMuscular once PRN Glucose LESS THAN 70 milligrams/deciliter  zolpidem 5milliGRAM(s) Oral at bedtime PRN Insomnia      Allergies    penicillin (Unknown)      Vital Signs Last 24 Hrs  T(C): 36.3, Max: 37 (04-29 @ 23:40)  T(F): 97.3, Max: 98.6 (04-29 @ 23:40)  HR: 60 (59 - 68)  BP: 157/60 (145/69 - 180/75)  BP(mean): --  RR: 18 (17 - 19)  SpO2: 97% (94% - 97%)    I&O's Summary    I & Os for current day (as of 30 Apr 2017 08:06)  =============================================  IN: 480 ml / OUT: 1000 ml / NET: -520 ml      ON EXAM:    General: NAD  HEENT: Mucous membranes are dry, anicteric  Lungs: Non-labored, breath sounds are clear bilaterally, No wheezing, rales or rhonchi.  Decreased breath sounds at the bases  Cardiovascular: Regular, S1 and S2, no rubs, or gallops.  2/6 systolic murmur  Gastrointestinal: Bowel Sounds present, soft, nontender.   Lymph: Minimal b/l LE edema. No lymphadenopathy.  Skin: No rashes or ulcers    LABS: All Labs Reviewed:                        12.8   7.5   )-----------( 143      ( 28 Apr 2017 06:51 )             40.5                         12.8   7.7   )-----------( 141      ( 27 Apr 2017 13:39 )             40.1     28 Apr 2017 06:51    146    |  111    |  50     ----------------------------<  87     4.3     |  25     |  1.90   27 Apr 2017 13:39    146    |  114    |  53     ----------------------------<  108    5.1     |  22     |  1.90     Ca    9.8        28 Apr 2017 06:51  Ca    9.8        27 Apr 2017 13:39    TPro  7.6    /  Alb  3.4    /  TBili  1.2    /  DBili  x      /  AST  27     /  ALT  17     /  AlkPhos  138    27 Apr 2017 13:39      CARDIAC MARKERS ( 28 Apr 2017 14:21 )  .028 ng/mL / x     / x     / x     / x          Blood Culture:   04-27 @ 13:39  Pro Bnp [id number]       PROCEDURE DATE:  04/29/2017        INTERPRETATION:  Ordering Physician: ROSE ALEJANDRO [id number]    Indication: CHF    Study Quality: Technically fair   A complete echocardiographic study was performedutilizing standard   protocol including spectral and color Doppler in all echocardiographic   windows.    Height: 165 cm  Weight: M.D. 4 kg  BSA: 1.8 sq m  Blood Pressure: 160/66    MEASUREMENTS  IVS: 1.2cm  PWT: 1.2cm  LA: 5.1cm  AO: 3.6cm  LVIDd: 4.6cm  LVIDs: 3.7cm    LVEF: 55%  RVSP: 67mmHg    FINDINGS  Left Ventricle: Endocardium is not well-visualized. Grossly, there   appears to be preserved left ventricular systolic function.  Aortic Valve: Calcified trileaflet aortic valve. Mild aortic   insufficiency.  Mitral Valve: Mitral annular calcination and calcified mitral leaflets.   Mild to moderate mitral insufficiency.  Tricuspid Valve: Normal tricuspid valve. At least moderate tricuspid   insufficiency.  Pulmonic Valve: Normal pulmonic valve. Moderate pulmonic insufficiency.  Left Atrium: Severely enlarged  Right Ventricle: Not well visualized. Grossly normal. A device wire is   noted in the right heart.  Right Atrium: Not well visualized. Grossly mildly enlarged.  Pericardium/Pleura: Normal pericardium with no pericardial effusion.   Large bilateral pleural effusions.      Assessment/Plan:   89y Male with CAD, CABG, normal LV function per his last echocardiogram, a/w acute diastolic CHF, now improved    - Continue aspirin 81 QD  - Continue Lasix 40 BID.  Maintain a negative balance  - BP uncontrolled.  Increase losartan to 50 QD.  Monitor K and creatinine  - Continue statin drug  - ICD check with adequate battery for now.  Needs to follow up as outpatient.  - D/C planning  - Monitor and replete potassium to greater than 4.0 and magnesium to greater than 2.0  - Supplemental oxygen as needed

## 2017-04-30 NOTE — CHART NOTE - NSCHARTNOTEFT_GEN_A_CORE
PGY-3 On Call Note.    Called overnight for elevated BP of 180/75 at midnight.   Pt getting Lasix 40mg PO BID and Losartan 25mg QD. (rec'd an additional dose of IV lasix 40mg earlier on 4/29)  Hydralazine 10mg PO given at 00:11 on 4/30.   4am /85. improved.   Will defer any changes of standing BP meds to primary team/cardio.

## 2017-04-30 NOTE — PROGRESS NOTE ADULT - SUBJECTIVE AND OBJECTIVE BOX
pt sen on rounds ua is negative for blood vss afebrile  lungs poor effort heart  regular rate and pacemaker in chest  abdomen benign extremities no edema neuro non focal

## 2017-05-01 VITALS
SYSTOLIC BLOOD PRESSURE: 114 MMHG | DIASTOLIC BLOOD PRESSURE: 65 MMHG | OXYGEN SATURATION: 96 % | RESPIRATION RATE: 18 BRPM | HEART RATE: 62 BPM | TEMPERATURE: 98 F

## 2017-05-01 PROCEDURE — 99285 EMERGENCY DEPT VISIT HI MDM: CPT | Mod: 25

## 2017-05-01 PROCEDURE — 84484 ASSAY OF TROPONIN QUANT: CPT

## 2017-05-01 PROCEDURE — 87086 URINE CULTURE/COLONY COUNT: CPT

## 2017-05-01 PROCEDURE — 93306 TTE W/DOPPLER COMPLETE: CPT

## 2017-05-01 PROCEDURE — 82553 CREATINE MB FRACTION: CPT

## 2017-05-01 PROCEDURE — 85610 PROTHROMBIN TIME: CPT

## 2017-05-01 PROCEDURE — 97530 THERAPEUTIC ACTIVITIES: CPT

## 2017-05-01 PROCEDURE — 82550 ASSAY OF CK (CPK): CPT

## 2017-05-01 PROCEDURE — 96374 THER/PROPH/DIAG INJ IV PUSH: CPT

## 2017-05-01 PROCEDURE — 71045 X-RAY EXAM CHEST 1 VIEW: CPT

## 2017-05-01 PROCEDURE — 93005 ELECTROCARDIOGRAM TRACING: CPT

## 2017-05-01 PROCEDURE — 83036 HEMOGLOBIN GLYCOSYLATED A1C: CPT

## 2017-05-01 PROCEDURE — 97161 PT EVAL LOW COMPLEX 20 MIN: CPT

## 2017-05-01 PROCEDURE — 80048 BASIC METABOLIC PNL TOTAL CA: CPT

## 2017-05-01 PROCEDURE — 81001 URINALYSIS AUTO W/SCOPE: CPT

## 2017-05-01 PROCEDURE — 99232 SBSQ HOSP IP/OBS MODERATE 35: CPT

## 2017-05-01 PROCEDURE — 83880 ASSAY OF NATRIURETIC PEPTIDE: CPT

## 2017-05-01 PROCEDURE — 71046 X-RAY EXAM CHEST 2 VIEWS: CPT

## 2017-05-01 PROCEDURE — 85730 THROMBOPLASTIN TIME PARTIAL: CPT

## 2017-05-01 PROCEDURE — 97116 GAIT TRAINING THERAPY: CPT

## 2017-05-01 PROCEDURE — 80053 COMPREHEN METABOLIC PANEL: CPT

## 2017-05-01 PROCEDURE — 85027 COMPLETE CBC AUTOMATED: CPT

## 2017-05-01 RX ORDER — WARFARIN SODIUM 2.5 MG/1
2 TABLET ORAL ONCE
Qty: 0 | Refills: 0 | Status: COMPLETED | OUTPATIENT
Start: 2017-05-01 | End: 2017-05-01

## 2017-05-01 RX ADMIN — WARFARIN SODIUM 2 MILLIGRAM(S): 2.5 TABLET ORAL at 17:32

## 2017-05-01 RX ADMIN — Medication 81 MILLIGRAM(S): at 13:09

## 2017-05-01 RX ADMIN — GABAPENTIN 100 MILLIGRAM(S): 400 CAPSULE ORAL at 17:32

## 2017-05-01 RX ADMIN — LOSARTAN POTASSIUM 50 MILLIGRAM(S): 100 TABLET, FILM COATED ORAL at 05:11

## 2017-05-01 RX ADMIN — Medication 40 MILLIGRAM(S): at 05:11

## 2017-05-01 RX ADMIN — GABAPENTIN 100 MILLIGRAM(S): 400 CAPSULE ORAL at 05:11

## 2017-05-01 RX ADMIN — Medication 40 MILLIGRAM(S): at 17:32

## 2017-05-01 NOTE — PROGRESS NOTE ADULT - SUBJECTIVE AND OBJECTIVE BOX
[facility] -- Miriam Carrasco Grossman, Wachsman, Pannella, Patel      Follow Up:  CHF    Subjective/Observations: Patient seen and examined. Events noted. Resting comfortably in chair. No complaints of chest pain, dyspnea, or palpitations reported.     PAST MEDICAL & SURGICAL HISTORY:  History of prostate cancer  Hypercholesteremia  HTN (hypertension)  H/O heart artery stent  DM (diabetes mellitus)  CAD (coronary artery disease)  Artificial cardiac pacemaker  A-fib  History of permanent cardiac pacemaker placement  History of cholecystectomy  H/O prostatectomy  Hx of CABG      MEDICATIONS  (STANDING):  simvastatin 10milliGRAM(s) Oral at bedtime  insulin lispro (HumaLOG) corrective regimen sliding scale  SubCutaneous three times a day before meals  dextrose 5%. 1000milliLiter(s) IV Continuous <Continuous>  dextrose 50% Injectable 12.5Gram(s) IV Push once  dextrose 50% Injectable 25Gram(s) IV Push once  dextrose 50% Injectable 25Gram(s) IV Push once  gabapentin 100milliGRAM(s) Oral two times a day  influenza   Vaccine 0.5milliLiter(s) IntraMuscular once  furosemide    Tablet 40milliGRAM(s) Oral two times a day  aspirin enteric coated 81milliGRAM(s) Oral daily  losartan 50milliGRAM(s) Oral daily  warfarin 2milliGRAM(s) Oral once    MEDICATIONS  (PRN):  dextrose Gel 1Dose(s) Oral once PRN Blood Glucose LESS THAN 70 milliGRAM(s)/deciliter  glucagon  Injectable 1milliGRAM(s) IntraMuscular once PRN Glucose LESS THAN 70 milligrams/deciliter  zolpidem 5milliGRAM(s) Oral at bedtime PRN Insomnia      Allergies    penicillin (Unknown)    Intolerances        REVIEW OF SYSTEMS: All other review of systems is negative unless indicated above    Vital Signs Last 24 Hrs  T(C): 36.5, Max: 37.1 (05-01 @ 05:16)  T(F): 97.7, Max: 98.7 (05-01 @ 05:16)  HR: 62 (60 - 65)  BP: 114/65 (114/65 - 150/71)  BP(mean): --  RR: 18 (17 - 18)  SpO2: 96% (96% - 99%)    I&O's Summary    I & Os for current day (as of 01 May 2017 15:46)  =============================================  IN: 0 ml / OUT: 500 ml / NET: -500 ml        PHYSICAL EXAM:     Constitutional: NAD, awake and alert, well-developed  HEENT: Moist Mucous Membranes, Anicteric  Pulmonary: Decreased breath sounds b/l.   Cardiovascular: Regular, S1 and S2, 2/6 SM  Gastrointestinal: Bowel Sounds present, soft, nontender.   Lymph: Trace peripheral edema. No lymphadenopathy.  Skin: No visible rashes or ulcers.  Psych:  Mood & affect appropriate    LABS: All Labs Reviewed:          PT/INR - ( 01 May 2017 09:36 )   PT: 14.9 sec;   INR: 1.36 ratio               Blood Culture: Organism --  Gram Stain Blood -- Gram Stain --  Specimen Source .Urine Clean Catch (Midstream)  Culture-Blood --

## 2017-05-01 NOTE — PROGRESS NOTE ADULT - ASSESSMENT
Eric presents to the emergency department with increasing dyspnea over the last 3 days. His wife was at [facility] and now rehabilitation as he has been by himself. Believes he is taking his medication but has not been eating well and has been eating fair foods. He had increasing shortness of breath at rest today, worse with lying down and came to the emergency department. He reports no chest pain, palpitations, lightheadedness, or syncope. He does have some worsening of his chronic leg edema  admit , diuretics , and ro mi , and cardiac eval , and monitor ,     PAST MEDICAL & SURGICAL HISTORY:  History of prostate cancer  Hypercholesteremia  HTN (hypertension)  H/O heart artery stent  DM (diabetes mellitus)  CAD (coronary artery disease)  Artificial cardiac pacemaker  A-fib  CKD stage 3  History of permanent cardiac pacemaker placement  History of cholecystectomy  H/O prostatectomy  Hx of CABG      patient seems better , CE negative , dec edema in legs , plan Physical therapy and rehab , resume diuretics   echo noted , valvular ds , ef 55 % ,  plan dc to rehab

## 2017-05-01 NOTE — PROGRESS NOTE ADULT - ASSESSMENT
89y Male with CAD, CABG, normal LV function per his last echocardiogram, a/w acute diastolic CHF, now improved   Continue aspirin 81 QD  - Continue Lasix 40 PO BID.  Maintain a negative balance  - BP  controlled.  Continue losartan  50 QD.  Monitor K and creatinine  - Continue statin drug  - ICD check with adequate battery for now.  Needs to follow up as outpatient.  - Monitor and replete potassium to greater than 4.0 and magnesium to greater than 2.0  - Supplemental oxygen as needed  - Further cardiac workup will depend on clinical course.   - All other workup per primary team. Will followup. 89y Male with CAD, CABG, normal LV function per his last echocardiogram, a/w acute diastolic CHF, now improved   Continue aspirin 81 QD  - Continue Lasix 40 PO BID.  Maintain a negative balance  - BP  controlled.  Continue losartan  50 QD.  Monitor K and creatinine  - Continue statin drug  - ICD check with adequate battery for now.  Needs to follow up as outpatient.  - Monitor and replete potassium to greater than 4.0 and magnesium to greater than 2.0  - Supplemental oxygen as needed  - Dose Coumadin. Goal INR 2-3 for CVA risk reduction.   - Further cardiac workup will depend on clinical course.   - All other workup per primary team. Will followup.

## 2017-05-01 NOTE — PROGRESS NOTE ADULT - SUBJECTIVE AND OBJECTIVE BOX
PCP  Subjective:   awake alert in bed , coherent , but weak     Objective:   T(F): 98.7, Max: 98.7 ( @ 05:16)  HR: 65 (58 - 68)  BP: 129/70 (128/57 - 180/75)  RR: 18 (16 - 19)  SpO2: 99% (94% - 100%)  Wt(kg): --  Daily     Daily Weight in k.4 (01 May 2017 05:16)    GENERAL:  wdwn  EYES: eomi  NECK: supple  CHEST/LUNG: essentially clear  HEART: s1 s2 irregular and 2/6 betsy  ABDOMEN: soft   EXTREMITIES:  trace edema   SKIN: warm   CNS: alert , non focal , but poor balance , weak , confused to date ,     Allergies: Allergies    penicillin (Unknown)    Intolerances        Medications:   simvastatin 10milliGRAM(s) Oral at bedtime  insulin lispro (HumaLOG) corrective regimen sliding scale  SubCutaneous three times a day before meals  dextrose 5%. 1000milliLiter(s) IV Continuous <Continuous>  dextrose Gel 1Dose(s) Oral once PRN  dextrose 50% Injectable 12.5Gram(s) IV Push once  dextrose 50% Injectable 25Gram(s) IV Push once  dextrose 50% Injectable 25Gram(s) IV Push once  glucagon  Injectable 1milliGRAM(s) IntraMuscular once PRN  gabapentin 100milliGRAM(s) Oral two times a day  influenza   Vaccine 0.5milliLiter(s) IntraMuscular once  furosemide    Tablet 40milliGRAM(s) Oral two times a day  aspirin enteric coated 81milliGRAM(s) Oral daily  zolpidem 5milliGRAM(s) Oral at bedtime PRN  losartan 50milliGRAM(s) Oral daily      LABS:             @ 06:51  INR 2.67   @ 13:39  INR 3.20    Urinalysis Basic - ( 2017 21:53 )    Color: Yellow / Appearance: Slightly Turbid / S.005 / pH: x  Gluc: x / Ketone: Negative  / Bili: Negative / Urobili: Negative   Blood: x / Protein: 25 mg/dL / Nitrite: Negative   Leuk Esterase: Negative / RBC: Negative /HPF / WBC 3-5   Sq Epi: x / Non Sq Epi: Occasional / Bacteria: Occasional        CAPILLARY BLOOD GLUCOSE  117 (2017 22:06)  85 (2017 16:38)  136 (2017 12:36)  92 (2017 08:02)            RECENT CULTURES:  Culture Results:   No growth ( @ 00:31)

## 2017-05-05 DIAGNOSIS — Z95.0 PRESENCE OF CARDIAC PACEMAKER: ICD-10-CM

## 2017-05-05 DIAGNOSIS — Z88.0 ALLERGY STATUS TO PENICILLIN: ICD-10-CM

## 2017-05-05 DIAGNOSIS — N18.3 CHRONIC KIDNEY DISEASE, STAGE 3 (MODERATE): ICD-10-CM

## 2017-05-05 DIAGNOSIS — E11.22 TYPE 2 DIABETES MELLITUS WITH DIABETIC CHRONIC KIDNEY DISEASE: ICD-10-CM

## 2017-05-05 DIAGNOSIS — I50.23 ACUTE ON CHRONIC SYSTOLIC (CONGESTIVE) HEART FAILURE: ICD-10-CM

## 2017-05-05 DIAGNOSIS — Z95.1 PRESENCE OF AORTOCORONARY BYPASS GRAFT: ICD-10-CM

## 2017-05-05 DIAGNOSIS — I25.10 ATHEROSCLEROTIC HEART DISEASE OF NATIVE CORONARY ARTERY WITHOUT ANGINA PECTORIS: ICD-10-CM

## 2017-05-05 DIAGNOSIS — I13.0 HYPERTENSIVE HEART AND CHRONIC KIDNEY DISEASE WITH HEART FAILURE AND STAGE 1 THROUGH STAGE 4 CHRONIC KIDNEY DISEASE, OR UNSPECIFIED CHRONIC KIDNEY DISEASE: ICD-10-CM

## 2017-05-05 DIAGNOSIS — Z79.4 LONG TERM (CURRENT) USE OF INSULIN: ICD-10-CM

## 2017-05-05 DIAGNOSIS — I48.91 UNSPECIFIED ATRIAL FIBRILLATION: ICD-10-CM

## 2017-05-05 DIAGNOSIS — E78.5 HYPERLIPIDEMIA, UNSPECIFIED: ICD-10-CM

## 2017-05-05 DIAGNOSIS — Z79.01 LONG TERM (CURRENT) USE OF ANTICOAGULANTS: ICD-10-CM

## 2018-12-08 NOTE — DISCHARGE NOTE ADULT - FUNCTIONAL SCREEN CURRENT LEVEL: AMBULATION, MLM
Progress Notes by David Jha DO at 07/27/17 03:46 PM     Author:  David Jha DO Service:  (none) Author Type:  Physician     Filed:  07/28/17 03:33 PM Encounter Date:  7/28/2017 Status:  Signed     :  David Jha DO (Physician)            Interventional Cardiology  Progress Note    Reason for Visit: Cardiovascular revisit[MN1.1T]  Last visit:[MN1.1C] 11/2/[MN1.1M]2016; Dec 8, 2014 with Dr. Kwadwo Aquino  PCP: Dr. Yevgeniy Pena   EYE: Dr. Kvng Vaughn    The patient was seen and examined and the chart was reviewed this date.  Thank you for your referral.  My impressions and recommendations are as follows:    IMPRESSIONS:  1. Coronary artery disease/Coronary Artery Bypass Graft (CABG)  - Aug 9, 2006 LIMA-LAD  - May 24, 2014 ECHO TDS ECHO EF 55%, mild/moderate MR  - Feb 19, 2016 ECHO EF 55%, mild MR  - Feb 19, 2016 Exercise nuclear stress test negative for ischemia  - No angina, but has not been exercising  2. Carotid disease with prior TIA  - Carotid disease, declining prior invasive work up  - Dec 2015 MRI and CT head/neck with prox PETR 70%, 50% vertebral  - Awaiting repeat CT neck, will revisit this 2017  3. Diabetes Mellitus on Amaryl  4. Dyslipidemia on Lipitor   5. Hypertension at goal on HCTZ, Coreg, Lisinopril  6. Lifelong nonsmoker    RECOMMENDATIONS:  1. Regarding his CVD, HTN status  - Reassurance provided after review of Feb 2016 ECHO and exercise nuclear stress test given CAD/CABG   - Repeat CTA carotid at next OV July 2017  - If syncope, chest pain with exertion, or worsening symptoms occur advised to notify office or go to nearest ER  2. Medications and labs reviewed  - As above  - Continue all other medications Plavix 75 mg, not on ASA given prior bruising/bleeding with DAPT  - Antihypertensives; HCTZ 12.5 mg, Coreg 6.25 mg BID, Lisinopril 20 mg BID  - Dyslipidemia; Lipitor 20 mg  - Renew FLP for[MN1.1C] winter 2017[MN1.2M]  - Last known vaccines; Influenza 2016  and Pneumovax 2012, Prevnar (13) 2012 Vaccines  3. Exercise at least 150 minutes aerobic activity per week as tolerated.   4. Emphasized the need for low salt, low fat, low cholesterol diet.  5. Return to clinic in 9 months at [facility] site upon completion of aforementioned studies or sooner for concerns.  All questions were answered to the patient's satisfaction and to the best of my abilities.    SUBJECTIVE:   Dillon Hay is a[MN1.1C] 82 year old[MN1.3T] male who presents today for a revisit.  He  was previously followed by my colleague Dr. Kwadwo Aquino.  He has a history of CAD/CABG, DM, HTN, HLD, nonsmoker.      He continues to be physically active, cutting grass every week.  He does not exercise formerly.  No falls.  No TIA/CVA symptoms. Denies any chest pain or shortness of breath at rest or with ambulation. Denies any dizziness or lightheadedness. Denies any palpitations, skipped beats or fluttering sensation. Denies any PND or orthopnea. Denies being awoken in the middle of the night with chest pain, shortness of breath or chest pain. Appetite is good.  Energy level good.    He lives alone, he is considering a life alert.[MN1.1C]    Use of Aspirin for Primary Prevention:[MN1.4T] Patient is already on aspirin, risks and benefits discussed.[MN1.4M]    Does patient smoke:[MN1.4T] No[MN1.4M]     Does patient exercise?[MN1.4T] Yes - Type: Walking Frequency: Daily[MN1.4M]  Was counseling given:[MN1.4T] Yes[MN1.4M]    Patient was assessed for falls risk?[MN1.4T] No.  Patient is not considered to be at increased risk for falls.[MN1.4M]    Dillon's BMI is 28.91, which is[MN1.4T] within normal parameters.(Ages 18-64 >/= 18.5 and <25; Over age 65 >/= 23 and <30)[MN1.4M]    Appointed medical health care power of [MN1.4T] not appointed[MN1.4M]          Past Medical History:     Diagnosis  Date   • Acute diverticulitis 5/28/2014   • Acute pancreatitis 5/28/2014   • Carotid artery  stenosis 11/9/2013   • Colonoscopy refused 5/8/2015   • Diverticulosis of colon (without mention of hemorrhage) 1/14/2009   • Elevated prostate specific antigen (PSA) 6/27/2003   • Essential hypertension, benign 7/7/2003   • Gouty arthritis of toe 5/20/2011   • Hemorrhage of gastrointestinal tract, unspecified 9/17/2003   • Hypertrophy of prostate without urinary obstruction and other lower urinary tract symptoms (LUTS) 12/6/2005   • Mixed hyperlipidemia     Hyperlipidemia    • Numbness and tingling of right arm and leg 10/11/2013   • Personal history of colonic polyps 9/17/2003   • Postsurgical aortocoronary bypass status 11/9/1998    Coronary artery disease, status post one-vessel        coronary artery bypass with a LIMA.         Cath report prior to CABG:                  Edilberto underwent cardiac cath and coronary angiogram at       [facility] on 8-09-96.  Catheterization revealed a normal       left ventricular end diastolic pressure.  Left ventricular       angiography revealed a normal-size left ventricle with normal        • SENILE NUCLEAR CATARACT 8/29/2008   • TIA (transient ischemic attack) 11/9/2013   • Type II or unspecified type diabetes mellitus without mention of complication, not stated as uncontrolled     Diabetes mellitus Adult Onset    • Unspecified hypothyroidism 2/29/2008   • Vertigo, benign positional 6/6/2012          Past Surgical History:      Procedure  Laterality Date   • CABG, ARTERY-VEIN, SINGLE  1996    Coronary Artery Bypass, 1     • Right inguinal hernia repair  2003[MN1.3T]    .    Allergies:[MN1.1C]   Allergies      Allergen   Reactions   • Gemfibrozil  Other - See Comments     Hepatitis    • Simvastatin  Other - See Comments     Hepatic enzyme elevation[MN1.3T]         Medications:[MN1.1C]   Current Outpatient Prescriptions     Medication  Sig   • metformin (GLUCOPHAGE) 500 MG tablet Take 1 Tab by mouth 3 (three) times daily with meals.   • atorvastatin (LIPITOR) 20 MG tablet  Take 1 Tab by mouth daily.   • lisinopril (PRINIVIL,ZESTRIL) 20 MG tablet Take 20 mg by mouth daily.   • clopidogrel (PLAVIX) 75 MG tablet Take 1 Tab by mouth daily.   • Carvedilol (COREG) 6.25 MG tablet Take 1 Tab by mouth 2 (two) times daily with meals.   • glimepiride (AMARYL) 4 MG tablet Take 1 Tab by mouth 2 (two) times daily.   • levothyroxine 50 MCG tablet Take 1 Tab by mouth daily.   • ACCU-CHEK DARELL PLUS test strip    • DIABETIC TESTING STRIPS Test 2X daily Diabetes [id number]. Accu-Chek Darell Plus   • DIABETIC TESTING METER Select based on LT[facility], located within [facility], patient or insurance preference. Test 2X dailyDx: 250.00 DM TYPE II-UNCOMPL, Accu-Chek Darell Plus   • DIABETIC TESTING LANCETS Test 2X dailyDx: 250.00 DM TYPE II-UNCOMPL, Accu-Chek Softclix   • Multiple Vitamins-Minerals (MULTIVITAL-M OR) 1 tablet per day[MN1.3T]       Social History:[MN1.1C]   History    Alcohol Use No     History    Smoking Status    • Never Smoker   Smokeless Tobacco    • Never Used     History    Drug Use No[MN1.3T]               Family History:[MN1.1C]   Family History      Problem  Relation Age of Onset   • Heart Father    • Diabetes Brother    • Heart Brother[MN1.3T]         Review of Systems   Constitutional:[MN1.1T] Negative[MN1.1C].  Negative for[MN1.1T] chills[MN1.1C],[MN1.1T] diaphoresis[MN1.1C],[MN1.1T] fever[MN1.1C],[MN1.1T] malaise/fatigue[MN1.1C] and[MN1.1T] weight loss[MN1.1C].   HENT:[MN1.1T] Negative[MN1.1C].  Negative for[MN1.1T] hearing loss[MN1.1C] and[MN1.1T] nosebleeds[MN1.1C].    Eyes:[MN1.1T] Negative[MN1.1C].  Negative for[MN1.1T] blurred vision[MN1.1C],[MN1.1T] double vision[MN1.1C],[MN1.1T] photophobia[MN1.1C] and[MN1.1T] pain[MN1.1C].   Respiratory:[MN1.1T] Negative[MN1.1C].  Negative for[MN1.1T] cough[MN1.1C],[MN1.1T] hemoptysis[MN1.1C],[MN1.1T] sputum production[MN1.1C],[MN1.1T] shortness of breath[MN1.1C] and[MN1.1T] wheezing[MN1.1C].    Cardiovascular:[MN1.1T] Negative[MN1.1C].  Negative for[MN1.1T] chest  pain[MN1.1C],[MN1.1T] palpitations[MN1.1C],[MN1.1T] orthopnea[MN1.1C],[MN1.1T] claudication[MN1.1C],[MN1.1T] leg swelling[MN1.1C] and[MN1.1T] PND[MN1.1C].   Gastrointestinal:[MN1.1T] Negative[MN1.1C].  Negative for[MN1.1T] abdominal pain[MN1.1C],[MN1.1T] blood in stool[MN1.1C],[MN1.1T] heartburn[MN1.1C],[MN1.1T] melena[MN1.1C],[MN1.1T] nausea[MN1.1C] and[MN1.1T] vomiting[MN1.1C].   Genitourinary:[MN1.1T] Negative[MN1.1C].  Negative for[MN1.1T] frequency[MN1.1C],[MN1.1T] hematuria[MN1.1C] and[MN1.1T] urgency[MN1.1C].   Musculoskeletal:[MN1.1T] Negative[MN1.1C].  Negative for[MN1.1T] back pain[MN1.1C],[MN1.1T] joint pain[MN1.1C],[MN1.1T] myalgias[MN1.1C] and[MN1.1T] neck pain[MN1.1C].   Skin:[MN1.1T] Negative[MN1.1C].  Negative for[MN1.1T] itching[MN1.1C] and[MN1.1T] rash[MN1.1C].   Neurological:[MN1.1T] Negative[MN1.1C].  Negative for[MN1.1T] dizziness[MN1.1C],[MN1.1T] tingling[MN1.1C],[MN1.1T] tremors[MN1.1C],[MN1.1T] sensory change[MN1.1C],[MN1.1T] speech change[MN1.1C],[MN1.1T] focal weakness[MN1.1C],[MN1.1T] loss of consciousness[MN1.1C],[MN1.1T] weakness[MN1.1C] and[MN1.1T] headaches[MN1.1C].   Endo/Heme/Allergies:[MN1.1T] Negative[MN1.1C].[MN1.1T]  Does not bruise/bleed easily[MN1.1C].   Psychiatric/Behavioral:[MN1.1T] Negative[MN1.1C].  Negative for[MN1.1T] depression[MN1.1C],[MN1.1T] memory loss[MN1.1C],[MN1.1T] substance abuse[MN1.1C] and[MN1.1T] suicidal ideas[MN1.1C]. The patient[MN1.1T] is not nervous/anxious[MN1.1C] and[MN1.1T] does not have insomnia[MN1.1C].[MN1.1T]        Patient was assessed for falls risk? Yes. Patient is not considered to be at increased risk for falls.    Is the patient diabetic? Yes -  Does patient exercise? Yes - Type: Walking Frequency: Sporadic (no set schedule)  Was counseling given: Yes    Dillon reports that his last dilated diabetic eye exam on Jan 2015 did not show signs of diabetic retinopathy.  Patient advised that this must be done yearly.    Depression  Screening:  Over the past 2 weeks, has patient felt down, depressed or hopeless? No  Over the past 2 weeks, has patient felt little interest or pleasure in doing things? No    OBJECTIVE:[MN1.1C]  /70 (BP Location: Right arm, Patient Position: Sitting, Cuff size: Regular)  Pulse 56  Resp 16  Ht 5' 7\" (1.702 m)  Wt 184 lb 9.6 oz (83.7 kg)  SpO2 95%  BMI 28.91 kg/m2[MN1.3T]  Repeat /82 did take meds today[MN1.1C]    Physical Exam   Constitutional: He is[MN1.1T] oriented to person, place, and time[MN1.1C]. He appears[MN1.1T] well-developed[MN1.1C] and[MN1.1T] well-nourished[MN1.1C].[MN1.1T] No distress[MN1.1C].   HENT:   Head:[MN1.1T] Normocephalic[MN1.1C] and[MN1.1T] atraumatic[MN1.1C].   Mouth/Throat: No[MN1.1T] oropharyngeal exudate[MN1.1C].   Eyes:[MN1.1T] Conjunctivae[MN1.1C] and[MN1.1T] EOM[MN1.1C] are normal.[MN1.1T] Pupils are equal, round, and reactive to light[MN1.1C]. Right eye exhibits[MN1.1T] no discharge[MN1.1C].   Neck:[MN1.1T] Normal range of motion[MN1.1C].[MN1.1T] Neck supple[MN1.1C].[MN1.1T] No JVD[MN1.1C] present.   Cardiovascular:[MN1.1T] Normal rate[MN1.1C],[MN1.1T] regular rhythm[MN1.1C],[MN1.1T] normal heart sounds[MN1.1C] and[MN1.1T] intact distal pulses[MN1.1C].  Exam reveals[MN1.1T] no gallop[MN1.1C] and[MN1.1T] no friction rub[MN1.1C].[MN1.1T]    No murmur[MN1.1C] heard.  Pulses:       Carotid pulses are[MN1.1T] 2+[MN1.1C] on the right side, and[MN1.1T] 2+[MN1.1C] on the left side.       Radial pulses are[MN1.1T] 2+[MN1.1C] on the right side, and[MN1.1T] 2+[MN1.1C] on the left side.        Dorsalis pedis pulses are[MN1.1T] 2+[MN1.1C] on the right side, and[MN1.1T] 2+[MN1.1C] on the left side.        Posterior tibial pulses are[MN1.1T] 2+[MN1.1C] on the right side, and[MN1.1T] 2+[MN1.1C] on the left side.[MN1.1T]   Well healed midline scar  No edema[MN1.1C]   Pulmonary/Chest:[MN1.1T] Effort normal[MN1.1C] and[MN1.1T] breath sounds normal[MN1.1C]. No[MN1.1T] respiratory  distress[MN1.1C]. He has[MN1.1T] no wheezes[MN1.1C]. He has[MN1.1T] no rales[MN1.1C]. He exhibits[MN1.1T] no tenderness[MN1.1C].   Abdominal:[MN1.1T] Soft[MN1.1C].[MN1.1T] Bowel sounds are normal[MN1.1C]. He exhibits[MN1.1T] no distension[MN1.1C]. There is[MN1.1T] no tenderness[MN1.1C]. There is[MN1.1T] no rebound[MN1.1C].   Musculoskeletal:[MN1.1T] Normal range of motion[MN1.1C]. He exhibits no[MN1.1T] edema[MN1.1C] or[MN1.1T] tenderness[MN1.1C].   Neurological: He is[MN1.1T] alert[MN1.1C] and[MN1.1T] oriented to person, place, and time[MN1.1C]. He displays[MN1.1T] normal reflexes[MN1.1C].[MN1.1T] Coordination[MN1.1C] and[MN1.1T] gait[MN1.1C] normal.   Skin: Skin is[MN1.1T] warm[MN1.1C] and[MN1.1T] dry[MN1.1C]. He is[MN1.1T] not diaphoretic[MN1.1C]. No[MN1.1T] cyanosis[MN1.1C]. Nails show[MN1.1T] no clubbing[MN1.1C].   Psychiatric: He has a[MN1.1T] normal mood and affect[MN1.1C]. His[MN1.1T] behavior is normal[MN1.1C].[MN1.1T] Judgment[MN1.1C] and[MN1.1T] thought content[MN1.1C] normal.[MN1.1T]   Nursing note[MN1.1C] and[MN1.1T] vitals[MN1.1C] reviewed.[MN1.1T]      Lab / Testing:  The following labs and diagnostic studies were reviewed with me independently and discussed with the patient. Refer to individual report(s) for complete details.[MN1.1C]  Lab Results      Component  Value Date    GLUCOSE 97 06/27/2017    BUN 22 06/27/2017    CREAT 1.3 06/27/2017    CA 9.8 06/27/2017    BILI 0.5 06/27/2017    BILIDIR 0.1 03/07/2017    ALT 21 06/27/2017    AST 24 06/27/2017    ALKPHOS 64 06/27/2017    PROT 7.6 06/27/2017    ALB 4.6 06/27/2017     06/27/2017    K 4.4 06/27/2017    K 4.3 05/25/2014    CL 99 06/27/2017    CO2 28 06/27/2017    GFR 62 02/09/2016       Lab Results      Component  Value Date    HGB 13.8 05/24/2016    HCT 40.3 05/24/2016    MCV 88.0 05/24/2016    WBC 11.0 05/24/2016    ANC 7.0 05/24/2016    ALC 2.9 05/24/2016     05/24/2016        Lab Results      Component  Value Date    TSH 3.07  03/10/2017       Lab Results      Component  Value Date    A1C 8.5 06/27/2017       Lab Results      Component  Value Date    CHOL 86 06/27/2017    HDL 33 06/27/2017    LDL 21 06/27/2017    LDL 71 09/04/2004    TRIG 163 06/27/2017       EKG[MN1.2T] 7/28/2017  Sinus bradycardia, first[MN1.2M] degree[MN1.5M] AVB HR 40, LAFB[MN1.2M]    EKG Jan 28, 2016  NSR first degree AVB HR 60, LAFB    EKG May 28, 2014  NSR first degree AVB HR 72, inferior infarct    ECHO Feb 19, 2016   * Normal LV size with normal function. LVEF=55%.     * Left ventricular filling pattern c/w grade 1 diastolic dysfunction.    * There is mild concentric hypertrophy.    * Poorly seen right ventricle.    * Normal left atrium. Poorly seen right atrium.    * Trileaflet aortic valve with moderate sclerosis. There trace aortic      regurgitation, no stenosis.    * Structurally normal mitral valve with mild anterior leaflet      calcification. There is mild mitral regurgitation.    * Tricuspid valve not well seen.    * Unable to assess PA pressure due to inability to obtain accurate      tricuspid regurgitation envelope.    ECHO May 24, 2014  TDS   EF 55%  Mild/moderate MR    Nuclear exercise stress test Feb 19, 2016  Peak Heart Rate: 122  Peak % Target Reached: 87%    Peak Blood  Pressure: 194/70  Peak Met: 7.20               Exercise Time: 6:11  1. Normal myocardial perfusion examination.   2. The overall quality of the study is good.  3. Normal perfusion imaging without evidence of inducible  ischemia or infarct.  4. Normal left ventricular volume.    CT Carotid Dec 8, 2015  1. Stable 50-60% stenosis involving the origin the left internal carotid artery.  2. the right internal carotid artery stenosis does appear to be somewhat worse when compared to the prior exam now estimated at  approximately 70%.[MN1.1C]    Electronically Signed by:    David Jha DO , 7/28/2017[MN1.3T]                         Revision History        User Key Date/Time  User Provider Type Action    > MN1.5 07/28/17 03:33 PM David Jha,  Physician Sign     MN1.3 07/28/17 03:27 PM David Jha,  Physician      MN1.4 07/28/17 03:25 PM David hJa,  Physician      MN1.2 07/28/17 03:16 PM David Jha,  Physician      MN1.1 07/27/17 03:46 PM David Jha,  Physician     C - Copied, M - Manual, T - Template             (2) assistive person

## 2022-12-02 NOTE — H&P ADULT - NSHPREVIEWOFSYSTEMS_GEN_ALL_CORE
In our practice, timely communication to you regarding your test results is a priority. We will communicate your results to you, either by phone call, mail or [employer] portal. You will always be contacted with test results, even if they are normal.  Please let us know if we are not meeting your expectations in this regard!     PLEASE NOTE DR JAVIER'S OFFICE HOURS ARE AS FOLLOWS:    MON WE ARE OUT OF THE OFFICE  TUES    8:00 A.M. TO 4:00 P.M.  WED     8:00 A.M. TO 4:00 P.M.  THURS 8:00 A.M. TO 4:00 P.M.  FRI        8:00 A.M. TO 3:00 P.M.    If you call our office for a medical reason or a med refill after Friday 12 PM, he will not receive an answer or refill until the following Tuesday. (we are out of the office Saturday, Sunday and Monday)     IF IT IS AN URGENT MESSAGE, WE DO HAVE A TRIAGE NURSE AND AN MD ON CALL.  OFFICE PHONE NUMBER: [phone]  OFFICE FAX NUMBER: [phone]    In the next few weeks, you may receive a [employer] survey regarding your most recent clinic visit with us. Please take a few moments out of your day to accurately evaluate your visit. We strive to provide you with the best medical care and hope you are happy with our services. Again, thank you for your time and we look forward to your next visit.       Your provider has ordered fasting labs   Nothing to eat for 8-10 hours prior to appointment, and only water is to be consumed  No appointment is needed for lab      Lab Hours  Mon-Th    7 a.m. to 6 p.m.  Friday      7 a.m. to 5 p.m.  Saturday  7 a.m. to 12 p.m.      You will either receive your test results at your upcoming appointment or we will call you or send you a letter with test results within 7 - 10 days after completing your test.    Dr Javier is referring you a Gastroenterologist.     MD Norbert Russell MD, MD, MD Jhonny Garvin, MD Kathie Man, AMELIE Cooper, DNP, RN, FNP-BC  William Powell, RAUL Maher NP      [facility]  [address]  [place] [address]  TELPHONE: [phone]     If you do not receive a phone call from the Department of Gastroenterology please call to schedule an appointment.       Lonnie Ryan MD   [place] [id number]  T: [phone]    Please call allergy to schedule your appointment.       [facility] Scheduling  [phone]    PLEASE CALL TODAY TO SCHEDULE YOUR APPOINTMENT.     [facility]: [phone]       sob , cp , no ha , no n/v  no d/c , no fever , no chills , no abd pain , feels weak , and tired ,
